# Patient Record
Sex: FEMALE | Race: WHITE | Employment: UNEMPLOYED | URBAN - METROPOLITAN AREA
[De-identification: names, ages, dates, MRNs, and addresses within clinical notes are randomized per-mention and may not be internally consistent; named-entity substitution may affect disease eponyms.]

---

## 2018-03-26 PROBLEM — R25.1 TREMOR, ANXIETY RELATED: Status: ACTIVE | Noted: 2018-03-26

## 2018-03-26 PROBLEM — F41.9 TREMOR, ANXIETY RELATED: Status: ACTIVE | Noted: 2018-03-26

## 2018-04-26 ENCOUNTER — HOSPITAL ENCOUNTER (OUTPATIENT)
Dept: MAMMOGRAPHY | Age: 38
Discharge: HOME | End: 2018-04-26
Attending: INTERNAL MEDICINE
Payer: SELF-PAY

## 2018-04-26 DIAGNOSIS — Z12.31 SCREENING MAMMOGRAM, ENCOUNTER FOR: ICD-10-CM

## 2018-04-26 DIAGNOSIS — Z00.00 ROUTINE ADULT HEALTH MAINTENANCE: ICD-10-CM

## 2018-04-26 PROCEDURE — 77067 SCR MAMMO BI INCL CAD: CPT

## 2019-04-18 ENCOUNTER — HOSPITAL ENCOUNTER (OUTPATIENT)
Dept: MAMMOGRAPHY | Age: 39
Discharge: HOME | End: 2019-04-18
Attending: NURSE PRACTITIONER
Payer: SUBSIDIZED

## 2019-04-18 DIAGNOSIS — Z12.39 BREAST CANCER SCREENING: ICD-10-CM

## 2019-04-18 PROCEDURE — 77067 SCR MAMMO BI INCL CAD: CPT

## 2019-04-26 ENCOUNTER — HOSPITAL ENCOUNTER (OUTPATIENT)
Dept: MAMMOGRAPHY | Age: 39
Discharge: HOME | End: 2019-04-26
Attending: NURSE PRACTITIONER
Payer: SUBSIDIZED

## 2019-04-26 DIAGNOSIS — Z80.3 FH: BREAST CANCER: ICD-10-CM

## 2019-04-26 DIAGNOSIS — R92.8 ABNORMAL SCREENING MAMMOGRAM: ICD-10-CM

## 2019-04-26 PROCEDURE — 76642 ULTRASOUND BREAST LIMITED: CPT

## 2019-04-26 PROCEDURE — 77065 DX MAMMO INCL CAD UNI: CPT

## 2021-10-29 ENCOUNTER — HOSPITAL ENCOUNTER (OUTPATIENT)
Dept: MAMMOGRAPHY | Age: 41
Discharge: HOME | End: 2021-10-29
Attending: INTERNAL MEDICINE

## 2021-10-29 DIAGNOSIS — Z12.31 ENCOUNTER FOR SCREENING MAMMOGRAM FOR MALIGNANT NEOPLASM OF BREAST: ICD-10-CM

## 2022-03-19 PROBLEM — R25.1 TREMOR, ANXIETY RELATED: Status: ACTIVE | Noted: 2018-03-26

## 2022-03-19 PROBLEM — F41.9 TREMOR, ANXIETY RELATED: Status: ACTIVE | Noted: 2018-03-26

## 2024-05-20 ENCOUNTER — OFFICE VISIT (OUTPATIENT)
Dept: INTERNAL MEDICINE CLINIC | Facility: CLINIC | Age: 44
End: 2024-05-20
Payer: COMMERCIAL

## 2024-05-20 VITALS
OXYGEN SATURATION: 100 % | HEART RATE: 56 BPM | DIASTOLIC BLOOD PRESSURE: 68 MMHG | WEIGHT: 130 LBS | BODY MASS INDEX: 22.2 KG/M2 | SYSTOLIC BLOOD PRESSURE: 112 MMHG | HEIGHT: 64 IN

## 2024-05-20 DIAGNOSIS — N39.41 URGE INCONTINENCE: ICD-10-CM

## 2024-05-20 DIAGNOSIS — R25.1 TREMOR, ANXIETY RELATED: ICD-10-CM

## 2024-05-20 DIAGNOSIS — Z13.220 LIPID SCREENING: ICD-10-CM

## 2024-05-20 DIAGNOSIS — Z13.1 DIABETES MELLITUS SCREENING: ICD-10-CM

## 2024-05-20 DIAGNOSIS — F41.9 TREMOR, ANXIETY RELATED: ICD-10-CM

## 2024-05-20 DIAGNOSIS — Z12.11 SCREENING FOR COLON CANCER: ICD-10-CM

## 2024-05-20 DIAGNOSIS — Z00.00 ANNUAL PHYSICAL EXAM: Primary | ICD-10-CM

## 2024-05-20 LAB
ALBUMIN SERPL-MCNC: 4 G/DL (ref 3.5–5)
ALBUMIN/GLOB SERPL: 1.4 (ref 1–1.9)
ALP SERPL-CCNC: 41 U/L (ref 35–104)
ALT SERPL-CCNC: 9 U/L (ref 12–65)
ANION GAP SERPL CALC-SCNC: 11 MMOL/L (ref 9–18)
AST SERPL-CCNC: 21 U/L (ref 15–37)
BILIRUB SERPL-MCNC: 1.3 MG/DL (ref 0–1.2)
BUN SERPL-MCNC: 15 MG/DL (ref 6–23)
CALCIUM SERPL-MCNC: 9.6 MG/DL (ref 8.8–10.2)
CHLORIDE SERPL-SCNC: 101 MMOL/L (ref 98–107)
CHOLEST SERPL-MCNC: 172 MG/DL (ref 0–200)
CO2 SERPL-SCNC: 27 MMOL/L (ref 20–28)
CREAT SERPL-MCNC: 0.83 MG/DL (ref 0.6–1.1)
ERYTHROCYTE [DISTWIDTH] IN BLOOD BY AUTOMATED COUNT: 12.7 % (ref 11.9–14.6)
EST. AVERAGE GLUCOSE BLD GHB EST-MCNC: 103 MG/DL
GLOBULIN SER CALC-MCNC: 2.9 G/DL (ref 2.3–3.5)
GLUCOSE SERPL-MCNC: 85 MG/DL (ref 70–99)
HBA1C MFR BLD: 5.2 % (ref 0–5.6)
HCT VFR BLD AUTO: 40.8 % (ref 35.8–46.3)
HDLC SERPL-MCNC: 67 MG/DL (ref 40–60)
HDLC SERPL: 2.6 (ref 0–5)
HGB BLD-MCNC: 13.2 G/DL (ref 11.7–15.4)
LDLC SERPL CALC-MCNC: 93 MG/DL (ref 0–100)
MCH RBC QN AUTO: 29.7 PG (ref 26.1–32.9)
MCHC RBC AUTO-ENTMCNC: 32.4 G/DL (ref 31.4–35)
MCV RBC AUTO: 91.7 FL (ref 82–102)
NRBC # BLD: 0 K/UL (ref 0–0.2)
PLATELET # BLD AUTO: 205 K/UL (ref 150–450)
PMV BLD AUTO: 12.7 FL (ref 9.4–12.3)
POTASSIUM SERPL-SCNC: 4.5 MMOL/L (ref 3.5–5.1)
PROT SERPL-MCNC: 6.9 G/DL (ref 6.3–8.2)
RBC # BLD AUTO: 4.45 M/UL (ref 4.05–5.2)
SODIUM SERPL-SCNC: 139 MMOL/L (ref 136–145)
TRIGL SERPL-MCNC: 57 MG/DL (ref 0–150)
TSH, 3RD GENERATION: 1.82 UIU/ML (ref 0.27–4.2)
VLDLC SERPL CALC-MCNC: 11 MG/DL (ref 6–23)
WBC # BLD AUTO: 7.1 K/UL (ref 4.3–11.1)

## 2024-05-20 PROCEDURE — 99396 PREV VISIT EST AGE 40-64: CPT | Performed by: NURSE PRACTITIONER

## 2024-05-20 ASSESSMENT — PATIENT HEALTH QUESTIONNAIRE - PHQ9
2. FEELING DOWN, DEPRESSED OR HOPELESS: NOT AT ALL
SUM OF ALL RESPONSES TO PHQ QUESTIONS 1-9: 0
SUM OF ALL RESPONSES TO PHQ9 QUESTIONS 1 & 2: 0
1. LITTLE INTEREST OR PLEASURE IN DOING THINGS: NOT AT ALL
SUM OF ALL RESPONSES TO PHQ QUESTIONS 1-9: 0

## 2024-05-20 NOTE — PROGRESS NOTES
Fern Morales (:  1980) is a 44 y.o. female,Established patient, here for evaluation of the following chief complaint(s):  Annual Exam      Assessment & Plan   1. Annual physical exam  2. Screening for colon cancer  -     Ambulatory referral to Gastroenterology  3. Lipid screening  -     Lipid Panel; Future  4. Tremor, anxiety related  -     TSH; Future  -     Comprehensive Metabolic Panel; Future  -     CBC; Future  5. Urge incontinence  -     BS - Physical Therapy, [facility]  6. Diabetes mellitus screening  -     Hemoglobin A1C; Future      Return in about 2 weeks (around 6/3/2024) for pap.     BP normal, HR is normal on recheck  Anxiety at home, gives her tachycardia or tremor but doesn't seem overwhelming at this time - today her son started new job and drove by himself so she was anxious this AM but otherwise doing well  Discussed diet/exercise  Refer to pelvic floor PT  Up to date mammogram  Get colonoscopy  F/u for PAP    Subjective   Patient is here for annual physical. She has IUD, no complaints.   She feels anxious at the office so HR runs high here.   She doesn't feel big issues.   She has some consistent allergy drainage, she was sick a few weeks ago     She is now teaching 2 days a week.     She had mini-arc for stress incontinence in 2015. She feels the stitches are lower down that used to be. She feels she needs to work on pelvic floor muscles. She has a tiny bit of urge incontinence with a little dribble lately.         Review of Systems       Objective   Physical Exam  Vitals reviewed.   Constitutional:       Appearance: Normal appearance.   HENT:      Head: Normocephalic.   Eyes:      Extraocular Movements: Extraocular movements intact.      Pupils: Pupils are equal, round, and reactive to light.   Cardiovascular:      Rate and Rhythm: Normal rate and regular rhythm.   Pulmonary:      Effort: Pulmonary effort is normal.      Breath sounds: No wheezing or rhonchi.

## 2024-06-05 ENCOUNTER — OFFICE VISIT (OUTPATIENT)
Dept: INTERNAL MEDICINE CLINIC | Facility: CLINIC | Age: 44
End: 2024-06-05

## 2024-06-05 VITALS
HEIGHT: 64 IN | DIASTOLIC BLOOD PRESSURE: 62 MMHG | SYSTOLIC BLOOD PRESSURE: 110 MMHG | OXYGEN SATURATION: 98 % | WEIGHT: 133 LBS | RESPIRATION RATE: 16 BRPM | HEART RATE: 99 BPM | BODY MASS INDEX: 22.71 KG/M2

## 2024-06-05 DIAGNOSIS — Z12.4 CERVICAL CANCER SCREENING: ICD-10-CM

## 2024-06-05 DIAGNOSIS — Z01.419 WELL WOMAN EXAM: Primary | ICD-10-CM

## 2024-06-05 DIAGNOSIS — Z97.5 IUD (INTRAUTERINE DEVICE) IN PLACE: ICD-10-CM

## 2024-06-05 DIAGNOSIS — Z12.11 SCREENING FOR COLON CANCER: ICD-10-CM

## 2024-06-05 SDOH — ECONOMIC STABILITY: INCOME INSECURITY: HOW HARD IS IT FOR YOU TO PAY FOR THE VERY BASICS LIKE FOOD, HOUSING, MEDICAL CARE, AND HEATING?: SOMEWHAT HARD

## 2024-06-05 SDOH — ECONOMIC STABILITY: FOOD INSECURITY: WITHIN THE PAST 12 MONTHS, THE FOOD YOU BOUGHT JUST DIDN'T LAST AND YOU DIDN'T HAVE MONEY TO GET MORE.: NEVER TRUE

## 2024-06-05 SDOH — ECONOMIC STABILITY: FOOD INSECURITY: WITHIN THE PAST 12 MONTHS, YOU WORRIED THAT YOUR FOOD WOULD RUN OUT BEFORE YOU GOT MONEY TO BUY MORE.: NEVER TRUE

## 2024-06-05 SDOH — ECONOMIC STABILITY: HOUSING INSECURITY
IN THE LAST 12 MONTHS, WAS THERE A TIME WHEN YOU DID NOT HAVE A STEADY PLACE TO SLEEP OR SLEPT IN A SHELTER (INCLUDING NOW)?: NO

## 2024-06-05 NOTE — PROGRESS NOTES
Fern Morales (:  1980) is a 44 y.o. female,Established patient, here for evaluation of the following chief complaint(s):  Gynecologic Exam (pap)      Assessment & Plan   1. Cervical cancer screening  2. Screening for colon cancer  -     Ambulatory referral to Gastroenterology      No follow-ups on file.     Reviewed prior lab  Mildly elevated bili  HDL is great  Glucose normal  Continue diet/exercise  Pap in office  Up to date on mammogram  Refer colonoscopy, but she may research cologuard    Subjective   Patient is here for PAP. No hx of abnormal pap. She has IUD unsure of her last period    Gynecologic Exam        Review of Systems       Objective   Physical Exam  Vitals reviewed. Exam conducted with a chaperone present.   Constitutional:       Appearance: Normal appearance.   HENT:      Head: Normocephalic.      Right Ear: External ear normal.      Left Ear: External ear normal.   Eyes:      Extraocular Movements: Extraocular movements intact.      Pupils: Pupils are equal, round, and reactive to light.   Cardiovascular:      Rate and Rhythm: Normal rate.   Pulmonary:      Effort: Pulmonary effort is normal.   Genitourinary:     General: Normal vulva.      Vagina: No vaginal discharge.      Cervix: No cervical motion tenderness.      Adnexa: Right adnexa normal and left adnexa normal.      Comments: IUD strings in place  Musculoskeletal:      Cervical back: Neck supple.   Neurological:      General: No focal deficit present.      Mental Status: She is alert.                  An electronic signature was used to authenticate this note.    --Elenita Barakat, APRN - CNP

## 2024-06-12 LAB
COLLECTION METHOD: NORMAL
CYTOLOGIST CVX/VAG CYTO: NORMAL
CYTOLOGY CVX/VAG DOC THIN PREP: NORMAL
DATE OF LMP: NORMAL
HPV APTIMA: NEGATIVE
Lab: NORMAL
PAP SOURCE: NORMAL
PATH REPORT.FINAL DX SPEC: NORMAL
STAT OF ADQ CVX/VAG CYTO-IMP: NORMAL